# Patient Record
Sex: FEMALE | Race: WHITE
[De-identification: names, ages, dates, MRNs, and addresses within clinical notes are randomized per-mention and may not be internally consistent; named-entity substitution may affect disease eponyms.]

---

## 2021-11-23 ENCOUNTER — HOSPITAL ENCOUNTER (EMERGENCY)
Dept: HOSPITAL 33 - ED | Age: 61
Discharge: HOME | End: 2021-11-23
Payer: COMMERCIAL

## 2021-11-23 VITALS — HEART RATE: 75 BPM | SYSTOLIC BLOOD PRESSURE: 118 MMHG | OXYGEN SATURATION: 96 % | DIASTOLIC BLOOD PRESSURE: 75 MMHG
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
## 2021-11-23 DIAGNOSIS — Z79.4: ICD-10-CM

## 2021-11-23 DIAGNOSIS — Z79.84: ICD-10-CM

## 2021-11-23 DIAGNOSIS — E78.5: ICD-10-CM

## 2021-11-23 DIAGNOSIS — S42.255A: Primary | ICD-10-CM
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
## 2021-11-23 DIAGNOSIS — I10: ICD-10-CM

## 2021-11-23 DIAGNOSIS — E11.8: ICD-10-CM

## 2021-11-23 PROCEDURE — 99284 EMERGENCY DEPT VISIT MOD MDM: CPT

## 2021-11-23 PROCEDURE — 73030 X-RAY EXAM OF SHOULDER: CPT

## 2021-11-23 RX ADMIN — OXYCODONE HYDROCHLORIDE AND ACETAMINOPHEN STA TAB: 5; 325 TABLET ORAL at 11:58

## 2021-11-23 RX ADMIN — ORPHENADRINE CITRATE ONE MG: 100 TABLET, EXTENDED RELEASE ORAL at 12:00

## 2021-11-23 RX ADMIN — PREDNISONE ONE MG: 20 TABLET ORAL at 11:58

## 2021-11-23 NOTE — XRAY
Indication: Pain following fall.



Comparison: None



3 view left shoulder obtained using portable technique demonstrates

nondisplaced comminuted fracture greater tuberosity humerus.  Elsewhere

osteopenia and mild acromioclavicular degenerative arthropathy.  No other

bony, articular, or soft tissue abnormalities.

## 2021-11-23 NOTE — ERPHSYRPT
- History of Present Illness


Time Seen by Provider: 11/23/21 10:32


Source: patient


Exam Limitations: no limitations


Physician History: 





This is a 61-year-old right-handed white female who has a history of diabetes, 

elevated cholesterol and hypertension who presents with left shoulder pain.  It 

was sudden in onset.  Patient was walking a horse and the horse took off running

and she held onto the reigns in order to try to control the horse.  Patient has 

had pain since this occurred this morning.  She is unable to extend or elevate 

her left shoulder.  She did not fall onto her shoulder


Occurred: just prior to arrival


Method of Injury: other (Sudden pulling)


Quality: constant, aching, throbbing


Severity of Pain-Max: moderate


Severity of Pain-Current: moderate


Extremities Pain Location: shoulder: left


Modifying Factors: Improves With: immobilization (Improves), movement (Worsens)


Allergies/Adverse Reactions: 








No Known Drug Allergies Allergy (Unverified 11/23/21 10:43)


   





Home Medications: 








Atorvastatin Calcium 1 tab DAILY 11/23/21 [History]


Dapagliflozin Propanediol [Farxiga] 1 tab DAILY 11/23/21 [History]


Dulaglutide [Trulicity] 1 units SQ WEEKLY 11/23/21 [History]


Ergocalciferol (Vitamin D2) [Vitamin D2] 50,000 unit PO Q7D 11/23/21 [History]


Insulin Glargine,Hum.rec.anlog [Basaglar Kwikpen U-100] 100 units SQ DAILY 

11/23/21 [History]


Meloxicam 1 tab DAILY 11/23/21 [History]


Naproxen 500 mg*** [Naprosyn 500 MG***] 1 tab DAILY 11/23/21 [History]


Omega 3,6,9 Combination No.7 [Omega Dha] 92 mg PO DAILY 11/23/21 [History]


Paroxetine Mesylate 7.5 mg PO DAILY 11/23/21 [History]


Saxagliptin HCl/Metformin HCl [Kombiglyze Xr 5-1,000 mg Tab] 1 tab DAILY 

11/23/21 [History]


Venlafaxine HCl [Venlafaxine HCl ER] 150 mg PO DAILY 11/23/21 [History]


lisinopriL [Lisinopril] 40 mg PO DAILY 11/23/21 [History]








Travel Risk





- International Travel


Have you traveled outside of the country in past 3 weeks: No





- Coronavirus Screening


Are you exhibiting any of the following symptoms?: No


Close contact with a COVID-19 positive Pt in past 14-21 Days: No





- Review of Systems


Constitutional: No Symptoms


Eyes: No Symptoms


Ears, Nose, & Throat: No Symptoms


Respiratory: No Symptoms


Cardiac: No Symptoms


Abdominal/Gastrointestinal: No Symptoms


Genitourinary Symptoms: No Symptoms


Musculoskeletal: Injury (Left shoulder), Joint Pain (Left shoulder)


Skin: No Symptoms


Neurological: No Symptoms


Psychological: No Symptoms


Endocrine: No Symptoms


Hematologic/Lymphatic: No Symptoms


Immunological/Allergic: No Symptoms


All Other Systems: Reviewed and Negative





- Past Medical History


Pertinent Past Medical History: Yes





- Past Surgical History


Past Surgical History: Yes





- Nursing Vital Signs


Nursing Vital Signs: 


                               Initial Vital Signs











Temperature  97.5 F   11/23/21 10:32


 


Pulse Rate  77   11/23/21 10:32


 


Respiratory Rate  15   11/23/21 10:32


 


Blood Pressure  153/71   11/23/21 10:32


 


O2 Sat by Pulse Oximetry  97   11/23/21 10:32








                                   Pain Scale











Pain Intensity                 8

















- Physical Exam


General Appearance: no apparent distress, alert, anxiety


Eyes, Ears, Nose, Throat Exam: normal ENT inspection, moist mucous membranes


Neck Exam: normal inspection, non-tender, supple, full range of motion


Cardiovascular/Respiratory Exam: chest non-tender, no respiratory distress


Abdominal Exam: non-tender


Back Exam: normal inspection, normal range of motion, No CVA tenderness, No 

vertebral tenderness


Shoulder Exam: normal inspection, no evidence of injury, bone tenderness, 

limited ROM, soft tissue tenderness


Elbow/Forearm Exam: normal inspection, non-tender, no evidence of injury, normal

 ROM


Wrist Exam: normal inspection, non-tender, no evidence of injury, normal ROM


Hand Exam: normal inspection, non-tender, no evidence of injury, normal ROM


Neuro/Tendon Exam: normal sensation, normal motor functions, normal tendon 

functions, responds to pain


Mental Status Exam: alert, oriented x 3, cooperative


Skin Exam: normal color, warm, dry


**SpO2 Interpretation**: normal


O2 Delivery: Room Air





- Course


Nursing assessment & vital signs reviewed: Yes


Ordered Tests: 


                               Active Orders 24 hr











 Category Date Time Status


 


 Sling Application STAT Care  11/23/21 11:47 Ordered


 


 SHOULDER Stat Exams  11/23/21 10:59 Completed














- Progress


Progress: improved, pain not gone completely, re-examined


Progress Note: 





11/23/21 11:49


X-ray of the left shoulder shows a nondisplaced, comminuted fracture of the 

greater tuberosity of the left humerus


Counseled pt/family regarding: diagnosis, need for follow-up, rad results





- Departure


Departure Disposition: Home


Clinical Impression: 


 Fracture of humerus, left, closed





Condition: Stable


Critical Care Time: No


Referrals: 


Provider,Unknown [Primary Care Provider] - [id number]


Additional Instructions: 


Take your medication as prescribed.  Wear the sling for comfort.  Follow-up with

the orthopedic clinic here at [facility] for further management.


Prescriptions: 


Oxycodone HCl/Acetaminophen [Percocet 5-325 mg Tablet] 1 each PO Q8H PRN PRN #6 

tablet MDD 3


 PRN Reason: Pain


Cyclobenzaprine HCl 10 mg*** [Cyclobenzaprine 10 MG***] 10 mg PO TID #10 tablet